# Patient Record
Sex: MALE | Race: BLACK OR AFRICAN AMERICAN | NOT HISPANIC OR LATINO | URBAN - METROPOLITAN AREA
[De-identification: names, ages, dates, MRNs, and addresses within clinical notes are randomized per-mention and may not be internally consistent; named-entity substitution may affect disease eponyms.]

---

## 2017-02-19 ENCOUNTER — EMERGENCY (EMERGENCY)
Facility: HOSPITAL | Age: 32
LOS: 1 days | Discharge: HOME | End: 2017-02-19
Attending: EMERGENCY MEDICINE | Admitting: EMERGENCY MEDICINE
Payer: COMMERCIAL

## 2017-02-19 VITALS
RESPIRATION RATE: 18 BRPM | OXYGEN SATURATION: 100 % | DIASTOLIC BLOOD PRESSURE: 104 MMHG | SYSTOLIC BLOOD PRESSURE: 147 MMHG | TEMPERATURE: 98 F | HEART RATE: 114 BPM

## 2017-02-19 PROCEDURE — 93010 ELECTROCARDIOGRAM REPORT: CPT | Mod: 59

## 2017-02-19 PROCEDURE — 99284 EMERGENCY DEPT VISIT MOD MDM: CPT | Mod: 25

## 2017-02-19 NOTE — ED ADULT TRIAGE NOTE - CHIEF COMPLAINT QUOTE
Pt c/o asthma exacerbation. States SOB since 5am. Has been using a new inhaler and nebulizer tx at home with no relief. + B/L expiratory wheezing observed. Denies CP. Pt c/o asthma exacerbation. States SOB since 5am. Has been using a new inhaler and nebulizer tx at home with no relief. + B/L expiratory wheezing observed. Denies CP. Respirations even/unlabored

## 2017-02-20 VITALS
RESPIRATION RATE: 16 BRPM | DIASTOLIC BLOOD PRESSURE: 63 MMHG | OXYGEN SATURATION: 100 % | SYSTOLIC BLOOD PRESSURE: 108 MMHG | HEART RATE: 104 BPM

## 2017-02-20 PROCEDURE — 71020: CPT | Mod: 26

## 2017-02-20 RX ORDER — IPRATROPIUM/ALBUTEROL SULFATE 18-103MCG
3 AEROSOL WITH ADAPTER (GRAM) INHALATION ONCE
Qty: 0 | Refills: 0 | Status: COMPLETED | OUTPATIENT
Start: 2017-02-20 | End: 2017-02-20

## 2017-02-20 RX ORDER — ALBUTEROL 90 UG/1
2 AEROSOL, METERED ORAL
Qty: 1 | Refills: 0 | OUTPATIENT
Start: 2017-02-20 | End: 2017-03-22

## 2017-02-20 RX ORDER — HYDROMORPHONE HYDROCHLORIDE 2 MG/ML
1 INJECTION INTRAMUSCULAR; INTRAVENOUS; SUBCUTANEOUS ONCE
Qty: 0 | Refills: 0 | Status: DISCONTINUED | OUTPATIENT
Start: 2017-02-20 | End: 2017-02-20

## 2017-02-20 RX ORDER — ALBUTEROL 90 UG/1
2.5 AEROSOL, METERED ORAL ONCE
Qty: 0 | Refills: 0 | Status: COMPLETED | OUTPATIENT
Start: 2017-02-20 | End: 2017-02-20

## 2017-02-20 RX ADMIN — Medication 3 MILLILITER(S): at 00:22

## 2017-02-20 RX ADMIN — ALBUTEROL 2.5 MILLIGRAM(S): 90 AEROSOL, METERED ORAL at 02:50

## 2017-02-20 RX ADMIN — Medication 3 MILLILITER(S): at 00:00

## 2017-02-20 RX ADMIN — Medication 3 MILLILITER(S): at 00:11

## 2017-02-20 RX ADMIN — ALBUTEROL 2.5 MILLIGRAM(S): 90 AEROSOL, METERED ORAL at 02:06

## 2017-02-20 RX ADMIN — Medication 60 MILLIGRAM(S): at 00:40

## 2017-02-20 NOTE — ED PROVIDER NOTE - MEDICAL DECISION MAKING DETAILS
asthma exacerbation, sob, no cough or fever, typical symptoms, unrelieved by albuterol->duonebs, prednisone, reassess

## 2017-02-20 NOTE — ED ADULT NURSE NOTE - CHIEF COMPLAINT QUOTE
Pt c/o asthma exacerbation. States SOB since 5am. Has been using a new inhaler and nebulizer tx at home with no relief. + B/L expiratory wheezing observed. Denies CP. Respirations even/unlabored

## 2017-02-20 NOTE — ED PROVIDER NOTE - ATTENDING CONTRIBUTION TO CARE
Marva  pt with h/o asthma  c/o 1 day of exacerbation related to change in weather  no reported fever or URI sxs  no recent travel  Last on steroids 1 yr ago  pt with b/l wheezing prolonged expiratory phase  but alert  able to speak in sentences  card with mild tachycardia  S1S2  no gallop  no perif edema [place]  pt with h/o asthma  c/o 1 day of exacerbation related to change in weather  no reported fever or URI sxs  no recent travel  Last on steroids 1 yr ago  pt with b/l wheezing prolonged expiratory phase  but alert  able to speak in sentences  card with mild tachycardia  S1S2  no gallop  no perif edema  CXR clear  pt relates can get PF to 400 when well

## 2017-02-20 NOTE — ED ADULT NURSE NOTE - OBJECTIVE STATEMENT
Pt arrives to Ed c/o asthma exacerbation.  Pt woke up this morning and stated he was having trouble breathing.  Pt used home inhaler and home nebulizer treatments with no improvement.  Pt has never been intubated.  Pt feels the weather changes are effecting his asthma. Pt denies CP or other complaints.  MD at bedside assessing pt.  Pt medicated as per EMAR.  Pt saturating 98% on RA prior to treatment.

## 2017-02-20 NOTE — ED ADULT NURSE NOTE - CHPI ED SYMPTOMS NEG
no diaphoresis/no fever/no chills/no headache/no edema/no chest pain/no cough/no hemoptysis/no body aches

## 2017-02-20 NOTE — ED PROVIDER NOTE - CARE PLAN
Principal Discharge DX:	Active asthma  Instructions for follow-up, activity and diet:	The patient was given verbal and written discharge instructions. Specifically, instructions when to return to the ED and when to seek follow-up from their pcp was discussed. Any specialty follow-up was discussed, including how to make an appointment.  Instructions were discussed in simple, plain language and was understood by the patient. The patient understands that should their symptoms worsen or any new symptoms arise, they should return to the ED immediately for further evaluation.

## 2017-02-20 NOTE — ED PROVIDER NOTE - OBJECTIVE STATEMENT
31yM hx asthma (never intubated, last ed visit >1yr ago, last exac 1mo ago), typically has asthma exacerbations with weather changes p/w sob and wheezing in setting of warmer weather today. SOB began at 9am today and worsened throughout day. Pt took a new asthma prn medication ("proair repoclick") x 2 treatments at 5pm and 2x albuterol neb treatments at 10pm today without improvement. Arrives in ED comfortable, but with sob and wheeze. Denies f/c, n/v/d, or chest pain. No recent illness.

## 2017-02-20 NOTE — ED PROVIDER NOTE - PLAN OF CARE
The patient was given verbal and written discharge instructions. Specifically, instructions when to return to the ED and when to seek follow-up from their pcp was discussed. Any specialty follow-up was discussed, including how to make an appointment.  Instructions were discussed in simple, plain language and was understood by the patient. The patient understands that should their symptoms worsen or any new symptoms arise, they should return to the ED immediately for further evaluation.

## 2017-03-02 ENCOUNTER — EMERGENCY (EMERGENCY)
Facility: HOSPITAL | Age: 32
LOS: 1 days | Discharge: HOME | End: 2017-03-02
Attending: EMERGENCY MEDICINE | Admitting: EMERGENCY MEDICINE
Payer: COMMERCIAL

## 2017-03-02 VITALS
SYSTOLIC BLOOD PRESSURE: 133 MMHG | HEART RATE: 64 BPM | RESPIRATION RATE: 14 BRPM | TEMPERATURE: 97 F | OXYGEN SATURATION: 100 % | DIASTOLIC BLOOD PRESSURE: 81 MMHG

## 2017-03-02 VITALS
HEART RATE: 70 BPM | TEMPERATURE: 99 F | OXYGEN SATURATION: 100 % | RESPIRATION RATE: 20 BRPM | SYSTOLIC BLOOD PRESSURE: 125 MMHG | DIASTOLIC BLOOD PRESSURE: 74 MMHG

## 2017-03-02 LAB
ALBUMIN SERPL ELPH-MCNC: 3.9 G/DL — SIGNIFICANT CHANGE UP (ref 3.3–5)
ALP SERPL-CCNC: 35 U/L — LOW (ref 40–120)
ALT FLD-CCNC: 11 U/L — SIGNIFICANT CHANGE UP (ref 4–41)
APPEARANCE UR: CLEAR — SIGNIFICANT CHANGE UP
AST SERPL-CCNC: 16 U/L — SIGNIFICANT CHANGE UP (ref 4–40)
BASOPHILS # BLD AUTO: 0.09 K/UL — SIGNIFICANT CHANGE UP (ref 0–0.2)
BASOPHILS NFR BLD AUTO: 0.9 % — SIGNIFICANT CHANGE UP (ref 0–2)
BILIRUB SERPL-MCNC: 0.3 MG/DL — SIGNIFICANT CHANGE UP (ref 0.2–1.2)
BILIRUB UR-MCNC: NEGATIVE — SIGNIFICANT CHANGE UP
BLOOD UR QL VISUAL: NEGATIVE — SIGNIFICANT CHANGE UP
BUN SERPL-MCNC: 17 MG/DL — SIGNIFICANT CHANGE UP (ref 7–23)
CALCIUM SERPL-MCNC: 9.1 MG/DL — SIGNIFICANT CHANGE UP (ref 8.4–10.5)
CHLORIDE SERPL-SCNC: 101 MMOL/L — SIGNIFICANT CHANGE UP (ref 98–107)
CO2 SERPL-SCNC: 31 MMOL/L — SIGNIFICANT CHANGE UP (ref 22–31)
COLOR SPEC: SIGNIFICANT CHANGE UP
CREAT SERPL-MCNC: 0.92 MG/DL — SIGNIFICANT CHANGE UP (ref 0.5–1.3)
EOSINOPHIL # BLD AUTO: 0.65 K/UL — HIGH (ref 0–0.5)
EOSINOPHIL NFR BLD AUTO: 6.7 % — HIGH (ref 0–6)
GLUCOSE SERPL-MCNC: 96 MG/DL — SIGNIFICANT CHANGE UP (ref 70–99)
GLUCOSE UR-MCNC: NEGATIVE — SIGNIFICANT CHANGE UP
HCT VFR BLD CALC: 42.8 % — SIGNIFICANT CHANGE UP (ref 39–50)
HGB BLD-MCNC: 14.1 G/DL — SIGNIFICANT CHANGE UP (ref 13–17)
IMM GRANULOCYTES NFR BLD AUTO: 1.1 % — SIGNIFICANT CHANGE UP (ref 0–1.5)
KETONES UR-MCNC: NEGATIVE — SIGNIFICANT CHANGE UP
LEUKOCYTE ESTERASE UR-ACNC: NEGATIVE — SIGNIFICANT CHANGE UP
LYMPHOCYTES # BLD AUTO: 2.4 K/UL — SIGNIFICANT CHANGE UP (ref 1–3.3)
LYMPHOCYTES # BLD AUTO: 24.7 % — SIGNIFICANT CHANGE UP (ref 13–44)
MCHC RBC-ENTMCNC: 30.2 PG — SIGNIFICANT CHANGE UP (ref 27–34)
MCHC RBC-ENTMCNC: 32.9 % — SIGNIFICANT CHANGE UP (ref 32–36)
MCV RBC AUTO: 91.6 FL — SIGNIFICANT CHANGE UP (ref 80–100)
MONOCYTES # BLD AUTO: 0.93 K/UL — HIGH (ref 0–0.9)
MONOCYTES NFR BLD AUTO: 9.6 % — SIGNIFICANT CHANGE UP (ref 2–14)
NEUTROPHILS # BLD AUTO: 5.52 K/UL — SIGNIFICANT CHANGE UP (ref 1.8–7.4)
NEUTROPHILS NFR BLD AUTO: 57 % — SIGNIFICANT CHANGE UP (ref 43–77)
NITRITE UR-MCNC: NEGATIVE — SIGNIFICANT CHANGE UP
PH UR: 7 — SIGNIFICANT CHANGE UP (ref 4.6–8)
PLATELET # BLD AUTO: 211 K/UL — SIGNIFICANT CHANGE UP (ref 150–400)
PMV BLD: 8.8 FL — SIGNIFICANT CHANGE UP (ref 7–13)
POTASSIUM SERPL-MCNC: 4.6 MMOL/L — SIGNIFICANT CHANGE UP (ref 3.5–5.3)
POTASSIUM SERPL-SCNC: 4.6 MMOL/L — SIGNIFICANT CHANGE UP (ref 3.5–5.3)
PROT SERPL-MCNC: 5.9 G/DL — LOW (ref 6–8.3)
PROT UR-MCNC: NEGATIVE — SIGNIFICANT CHANGE UP
RBC # BLD: 4.67 M/UL — SIGNIFICANT CHANGE UP (ref 4.2–5.8)
RBC # FLD: 13.5 % — SIGNIFICANT CHANGE UP (ref 10.3–14.5)
SODIUM SERPL-SCNC: 142 MMOL/L — SIGNIFICANT CHANGE UP (ref 135–145)
SP GR SPEC: 1.01 — SIGNIFICANT CHANGE UP (ref 1–1.03)
UROBILINOGEN FLD QL: NORMAL E.U. — SIGNIFICANT CHANGE UP (ref 0.1–0.2)
WBC # BLD: 9.7 K/UL — SIGNIFICANT CHANGE UP (ref 3.8–10.5)
WBC # FLD AUTO: 9.7 K/UL — SIGNIFICANT CHANGE UP (ref 3.8–10.5)
WBC UR QL: SIGNIFICANT CHANGE UP (ref 0–?)

## 2017-03-02 PROCEDURE — 74177 CT ABD & PELVIS W/CONTRAST: CPT | Mod: 26

## 2017-03-02 PROCEDURE — 99285 EMERGENCY DEPT VISIT HI MDM: CPT

## 2017-03-02 RX ADMIN — Medication 30 MILLILITER(S): at 09:06

## 2017-03-02 NOTE — ED PROVIDER NOTE - MEDICAL DECISION MAKING DETAILS
31 year old male with past medical history of Asthma (on Albuterol) presents to the Emergency Department complaining of constant LLQ abdominal pressure x2 days. Today morning, patient strained to have a loose bowel movement and noticed blood upon wiping. 31 year old male with past medical history of Asthma (on Albuterol) presents to the Emergency Department complaining of constant LLQ abdominal pressure x2 days. Today morning, patient strained to have a loose bowel movement and noticed blood upon wiping.- rectal exam: normal, ttp to LLQ: basic labs, ct- if negative pt stable for discharge and to follow up with GI outpt

## 2017-03-02 NOTE — ED PROVIDER NOTE - NS ED MD SCRIBE ATTENDING SCRIBE SECTIONS
HISTORY OF PRESENT ILLNESS/PAST MEDICAL/SURGICAL/SOCIAL HISTORY/HIV/REVIEW OF SYSTEMS/VITAL SIGNS( Pullset)/DISPOSITION/PHYSICAL EXAM

## 2017-03-02 NOTE — ED ADULT TRIAGE NOTE - CHIEF COMPLAINT QUOTE
Pt c/o non-radiating LLQ abd tenderness to palpation/uneasiness for 48hrs, denies n/v/dysuria/fevers/chills.  Pt reports increased pressure w/ BM, noticed scant amt blood after BM when wiping.  PMHx Asthma, denies sob/cp or other symptoms.  Pt appears comfortable in triage, no guarding/grimacing noticed.

## 2017-03-02 NOTE — ED PROVIDER NOTE - ATTENDING CONTRIBUTION TO CARE
I performed a face to face evaluation of this patient and obtained a history and performed a full exam except the  and rectal exam.  I agree with the history, physical exam and plan of the PA.  pt with lower llq pain, some loose stool x 2 days. No dysuria, fever, travel.  Belly soft without masses but llq ttp. No cvat.  For labs, UA, reassess.  CT abdomen/pelvis

## 2017-03-02 NOTE — ED PROVIDER NOTE - PROGRESS NOTE DETAILS
The scribe's documentation has been prepared under my direction and personally reviewed by me in its entirety. I confirm that the note above accurately reflects all work, treatment, procedures, and medical decision making performed by me. CHANEL Lundberg Labs and ct reviewed. Pt feeling better. Pt stable for discharge and to follow up with GI outpt.

## 2017-03-02 NOTE — ED PROVIDER NOTE - OBJECTIVE STATEMENT
31 year old male with past medical history of Asthma (on Albuterol) presents to the Emergency Department complaining of constant LLQ abdominal pressure x2 days. Today morning, patient strained to have a loose bowel movement and noticed blood upon wiping. No prior history of symptoms or sick contacts.   Denies fever, chills, nausea, vomiting, chest pain, shortness of breath, urinary symptoms, URI symptoms, or any other complaints. 31 year old male with past medical history of Asthma (on Albuterol) presents to the Emergency Department complaining of constant LLQ abdominal pressure x2 days. Today morning, patient strained to have a loose bowel movement and noticed blood upon wiping. No prior history of symptoms or sick contacts.   Denies fever, chills, nausea, vomiting, chest pain, shortness of breath, urinary symptoms, URI symptoms, or any other complaints. Denies any penile pain or discharge.

## 2017-03-08 NOTE — ED PROVIDER NOTE - ATTESTATION, MLM
Spoke with Kortney @ [facility] - she will check for implant records and fax it over.     Faxed [facility] to push images to PACS.    I have reviewed and confirmed nurses' notes for patient's medications, allergies, medical history, and surgical history.

## 2017-05-23 ENCOUNTER — EMERGENCY (EMERGENCY)
Facility: HOSPITAL | Age: 32
LOS: 1 days | Discharge: HOME | End: 2017-05-23
Attending: EMERGENCY MEDICINE | Admitting: EMERGENCY MEDICINE
Payer: COMMERCIAL

## 2017-05-23 VITALS
RESPIRATION RATE: 16 BRPM | SYSTOLIC BLOOD PRESSURE: 107 MMHG | DIASTOLIC BLOOD PRESSURE: 61 MMHG | HEART RATE: 64 BPM | TEMPERATURE: 98 F | OXYGEN SATURATION: 96 %

## 2017-05-23 VITALS
OXYGEN SATURATION: 96 % | DIASTOLIC BLOOD PRESSURE: 93 MMHG | RESPIRATION RATE: 18 BRPM | TEMPERATURE: 99 F | HEART RATE: 78 BPM | SYSTOLIC BLOOD PRESSURE: 139 MMHG

## 2017-05-23 LAB
ALBUMIN SERPL ELPH-MCNC: 4.4 G/DL — SIGNIFICANT CHANGE UP (ref 3.3–5)
ALP SERPL-CCNC: 43 U/L — SIGNIFICANT CHANGE UP (ref 40–120)
ALT FLD-CCNC: 13 U/L — SIGNIFICANT CHANGE UP (ref 4–41)
AST SERPL-CCNC: 16 U/L — SIGNIFICANT CHANGE UP (ref 4–40)
BASOPHILS # BLD AUTO: 0.09 K/UL — SIGNIFICANT CHANGE UP (ref 0–0.2)
BASOPHILS NFR BLD AUTO: 1.1 % — SIGNIFICANT CHANGE UP (ref 0–2)
BILIRUB SERPL-MCNC: 0.3 MG/DL — SIGNIFICANT CHANGE UP (ref 0.2–1.2)
BUN SERPL-MCNC: 17 MG/DL — SIGNIFICANT CHANGE UP (ref 7–23)
CALCIUM SERPL-MCNC: 9.1 MG/DL — SIGNIFICANT CHANGE UP (ref 8.4–10.5)
CHLORIDE SERPL-SCNC: 106 MMOL/L — SIGNIFICANT CHANGE UP (ref 98–107)
CO2 SERPL-SCNC: 25 MMOL/L — SIGNIFICANT CHANGE UP (ref 22–31)
CREAT SERPL-MCNC: 1.15 MG/DL — SIGNIFICANT CHANGE UP (ref 0.5–1.3)
EOSINOPHIL # BLD AUTO: 1.22 K/UL — HIGH (ref 0–0.5)
EOSINOPHIL NFR BLD AUTO: 15.2 % — HIGH (ref 0–6)
GLUCOSE SERPL-MCNC: 113 MG/DL — HIGH (ref 70–99)
HBA1C BLD-MCNC: 5.9 % — HIGH (ref 4–5.6)
HCT VFR BLD CALC: 44.8 % — SIGNIFICANT CHANGE UP (ref 39–50)
HGB BLD-MCNC: 14.9 G/DL — SIGNIFICANT CHANGE UP (ref 13–17)
IMM GRANULOCYTES NFR BLD AUTO: 0.4 % — SIGNIFICANT CHANGE UP (ref 0–1.5)
LYMPHOCYTES # BLD AUTO: 1.7 K/UL — SIGNIFICANT CHANGE UP (ref 1–3.3)
LYMPHOCYTES # BLD AUTO: 21.2 % — SIGNIFICANT CHANGE UP (ref 13–44)
MCHC RBC-ENTMCNC: 30 PG — SIGNIFICANT CHANGE UP (ref 27–34)
MCHC RBC-ENTMCNC: 33.3 % — SIGNIFICANT CHANGE UP (ref 32–36)
MCV RBC AUTO: 90.1 FL — SIGNIFICANT CHANGE UP (ref 80–100)
MONOCYTES # BLD AUTO: 0.59 K/UL — SIGNIFICANT CHANGE UP (ref 0–0.9)
MONOCYTES NFR BLD AUTO: 7.4 % — SIGNIFICANT CHANGE UP (ref 2–14)
NEUTROPHILS # BLD AUTO: 4.38 K/UL — SIGNIFICANT CHANGE UP (ref 1.8–7.4)
NEUTROPHILS NFR BLD AUTO: 54.7 % — SIGNIFICANT CHANGE UP (ref 43–77)
PLATELET # BLD AUTO: 200 K/UL — SIGNIFICANT CHANGE UP (ref 150–400)
PMV BLD: 9.6 FL — SIGNIFICANT CHANGE UP (ref 7–13)
POTASSIUM SERPL-MCNC: 4.1 MMOL/L — SIGNIFICANT CHANGE UP (ref 3.5–5.3)
POTASSIUM SERPL-SCNC: 4.1 MMOL/L — SIGNIFICANT CHANGE UP (ref 3.5–5.3)
PROT SERPL-MCNC: 6.5 G/DL — SIGNIFICANT CHANGE UP (ref 6–8.3)
RBC # BLD: 4.97 M/UL — SIGNIFICANT CHANGE UP (ref 4.2–5.8)
RBC # FLD: 12.4 % — SIGNIFICANT CHANGE UP (ref 10.3–14.5)
SODIUM SERPL-SCNC: 144 MMOL/L — SIGNIFICANT CHANGE UP (ref 135–145)
WBC # BLD: 8.01 K/UL — SIGNIFICANT CHANGE UP (ref 3.8–10.5)
WBC # FLD AUTO: 8.01 K/UL — SIGNIFICANT CHANGE UP (ref 3.8–10.5)

## 2017-05-23 PROCEDURE — 99234 HOSP IP/OBS SM DT SF/LOW 45: CPT

## 2017-05-23 RX ORDER — SODIUM CHLORIDE 9 MG/ML
1000 INJECTION INTRAMUSCULAR; INTRAVENOUS; SUBCUTANEOUS ONCE
Qty: 0 | Refills: 0 | Status: COMPLETED | OUTPATIENT
Start: 2017-05-23 | End: 2017-05-23

## 2017-05-23 RX ORDER — IPRATROPIUM/ALBUTEROL SULFATE 18-103MCG
3 AEROSOL WITH ADAPTER (GRAM) INHALATION ONCE
Qty: 0 | Refills: 0 | Status: COMPLETED | OUTPATIENT
Start: 2017-05-23 | End: 2017-05-23

## 2017-05-23 RX ORDER — IPRATROPIUM/ALBUTEROL SULFATE 18-103MCG
3 AEROSOL WITH ADAPTER (GRAM) INHALATION EVERY 4 HOURS
Qty: 0 | Refills: 0 | Status: DISCONTINUED | OUTPATIENT
Start: 2017-05-23 | End: 2017-05-27

## 2017-05-23 RX ORDER — MAGNESIUM SULFATE 500 MG/ML
2 VIAL (ML) INJECTION ONCE
Qty: 0 | Refills: 0 | Status: COMPLETED | OUTPATIENT
Start: 2017-05-23 | End: 2017-05-23

## 2017-05-23 RX ADMIN — Medication 50 GRAM(S): at 08:44

## 2017-05-23 RX ADMIN — Medication 3 MILLILITER(S): at 08:15

## 2017-05-23 RX ADMIN — SODIUM CHLORIDE 1000 MILLILITER(S): 9 INJECTION INTRAMUSCULAR; INTRAVENOUS; SUBCUTANEOUS at 08:44

## 2017-05-23 RX ADMIN — Medication 3 MILLILITER(S): at 07:49

## 2017-05-23 RX ADMIN — Medication 3 MILLILITER(S): at 07:21

## 2017-05-23 RX ADMIN — Medication 50 MILLIGRAM(S): at 07:21

## 2017-05-23 RX ADMIN — Medication 3 MILLILITER(S): at 13:21

## 2017-05-23 NOTE — ED PROVIDER NOTE - OBJECTIVE STATEMENT
30 yo M pmhx of asthma ( no intubations, several lifetime admissions) here for wheezing x today. pt states he woke up feeling like he was wheezing, used albuterol neb x 2, last being 2 hours PTA but still felt wheezing. States he felt like he needed a steroid to help his symptoms. Otherwise well. Denies fever, vomiting, CP, HA, dizziness, congestion

## 2017-05-23 NOTE — ED ADULT NURSE NOTE - OBJECTIVE STATEMENT
Pt. started wheezing about 4 hours ago, took Proventil at home. Pt. noted with inspiratory and expiratory wheezing. Pt. in tripod position for comfort. Administered Duoneb as ordered and Prednisone as ordered.

## 2017-05-23 NOTE — ED ADULT TRIAGE NOTE - CHIEF COMPLAINT QUOTE
Pt c/o difficulty breathing and wheezing since 3am.  Pt took regular meds at home with no relief, states "I think I need a steroid."

## 2017-05-23 NOTE — ED CDU PROVIDER NOTE - ATTENDING CONTRIBUTION TO CARE
pt with asthma exacerbation - lungs with diffuse exp wheezes and prolonged expiratory phase but good air movement.  Will treat symptomatically with nebs and steroids.  Likely able to be dc home 32 yo M with hx asthma here with asthma exacerbation - lungs with diffuse exp wheezes and prolonged expiratory phase but good air movement.  Will treat symptomatically with nebs and steroids and observe for improvement in the CDU.  I performed a history and physical exam of the patient and discussed their management with the advanced care provider. I reviewed the advanced care provider's note and agree with the documented findings and plan of care. My medical decision making and objective findings are found above.

## 2017-05-23 NOTE — ED PROVIDER NOTE - PROGRESS NOTE DETAILS
CHANEL Scherer: discussed with patient, improved however still wheezing with mild retractions, labs reassuring, good candidate for CDU, pt agrees for CDU. D/w CDU attending and PA. Pt accepted.

## 2017-05-23 NOTE — ED CDU PROVIDER NOTE - PLAN OF CARE
Rest, drink plenty of fluids  Advance activity as tolerated  Take Prednisone 40 mg once a day for five days  Use inhaler/nebulizer as directed- every 4-6 hours as needed  Continue all previously prescribed medications as tolerated  Follow up with your PMD 2-3 days  Return to the ER for worsening or concerning symptoms.

## 2017-05-23 NOTE — ED PROVIDER NOTE - ATTENDING CONTRIBUTION TO CARE
pt with asthma exacerbation - lungs with diffuse exp wheezes and prolonged expiratory phase but good air movement.  Will treat symptomatically with nebs and steroids.  Likely able to be dc home pt with asthma exacerbation - lungs with diffuse exp wheezes and prolonged expiratory phase but good air movement.  Will treat symptomatically with nebs and steroids.  Likely able to be dc home.

## 2017-05-23 NOTE — ED CDU PROVIDER NOTE - MEDICAL DECISION MAKING DETAILS
32 yo M here for asthma exacerbation. duonebs x 3, steroids, mg  possible cdu vs. d/c home Asthma exacerbation: Duo nebs, steroids, will re eval

## 2017-05-23 NOTE — ED CDU PROVIDER NOTE - OBJECTIVE STATEMENT
32 y/o M PMHx of asthma ( no intubations, several lifetime admissions) here for wheezing for 2 days. pt states he woke up feeling like he was wheezing, used albuterol neb x 2, last being 2 hours PTA  with not much relief. States he felt like he needed a steroid to help his symptoms. Otherwise well. Denies fever, vomiting, CP, HA, dizziness, congestion. Pt feeling better after the 3 doses of Duonebs, IVPB Magnesium sulfate, prednisone. 30 y/o M PMHx of asthma ( no intubations, several lifetime admissions) here for wheezing for 2 days. pt states he woke up feeling like he was wheezing, used albuterol neb x 2, last being 2 hours PTA  with not much relief. States he felt like he needed a steroid to help his symptoms. Otherwise well. Denies fever, vomiting, CP, HA, dizziness, congestion.

## 2017-05-23 NOTE — ED CDU PROVIDER NOTE - DETAILS
30 y/o pt is in CDU to get his mediaction for asthma and reassess Asthma exacerbation, duonebs, steroids, re- eval Asthma exacerbation, Duoneb, steroids, re- eval

## 2017-05-23 NOTE — ED ADULT NURSE REASSESSMENT NOTE - NS ED NURSE REASSESS COMMENT FT1
Received pt from [facility], pt A&Ox3, hx of asthma, here for shortness of breath and wheezing, pt given his third duoneb treatment, reports improvement in shortness of breath. Denies any chest pain, dizziness, nausea, vomiting, palpitations, diarrhea, fever, constipation, or chills. Will continue to monitor.

## 2017-05-23 NOTE — ED PROVIDER NOTE - CHPI ED SYMPTOMS NEG
no body aches/no chills/no diaphoresis/no fever/no edema/no cough/no hemoptysis/no headache/no chest pain

## 2017-05-23 NOTE — ED CDU PROVIDER NOTE - PROGRESS NOTE DETAILS
CHANEL Scherer: discussed with patient, improved however still wheezing with mild retractions, labs reassuring, good candidate for CDU, pt agrees for CDU. D/w CDU attending and PA. Pt accepted. Juan PA: Pt feeling better after the 3 doses of Duonebs, IVPB Magnesium sulfate, prednisone. Will continue to monitor. I, Magdalena Bruce MD, have personally performed a face to face diagnostic evaluation on this patient.  I have reviewed the ACP note and agree with the history, exam, and plan of care, except as noted.  Pt having significant improvement with nebs and steroids; decrease in expiratory wheezing. I, Dr Magdalena Bruce have seen and evaluated the patient. The patient reports improvement in symptoms. The patient is stable for discharge home and will follow up with their primary physician. CHANEL Lundberg: Pt feeling better. Lungs, clear bilaterally, no wheezing noted. No signs of respiratory distress. Pt stable for discharge and to follow up with pmd.

## 2019-04-18 ENCOUNTER — EMERGENCY (EMERGENCY)
Facility: HOSPITAL | Age: 34
LOS: 1 days | Discharge: HOME | End: 2019-04-18
Attending: EMERGENCY MEDICINE | Admitting: EMERGENCY MEDICINE
Payer: COMMERCIAL

## 2019-04-18 PROCEDURE — 99284 EMERGENCY DEPT VISIT MOD MDM: CPT | Mod: 25

## 2019-04-19 VITALS
HEART RATE: 79 BPM | TEMPERATURE: 98 F | SYSTOLIC BLOOD PRESSURE: 122 MMHG | RESPIRATION RATE: 18 BRPM | DIASTOLIC BLOOD PRESSURE: 91 MMHG | OXYGEN SATURATION: 98 %

## 2019-04-19 VITALS
SYSTOLIC BLOOD PRESSURE: 112 MMHG | HEART RATE: 82 BPM | TEMPERATURE: 98 F | DIASTOLIC BLOOD PRESSURE: 66 MMHG | OXYGEN SATURATION: 95 % | RESPIRATION RATE: 16 BRPM

## 2019-04-19 RX ORDER — IPRATROPIUM/ALBUTEROL SULFATE 18-103MCG
3 AEROSOL WITH ADAPTER (GRAM) INHALATION ONCE
Qty: 0 | Refills: 0 | Status: COMPLETED | OUTPATIENT
Start: 2019-04-19 | End: 2019-04-19

## 2019-04-19 RX ORDER — ALBUTEROL 90 UG/1
1 AEROSOL, METERED ORAL ONCE
Qty: 0 | Refills: 0 | Status: COMPLETED | OUTPATIENT
Start: 2019-04-19 | End: 2019-04-19

## 2019-04-19 RX ORDER — ALBUTEROL 90 UG/1
2.5 AEROSOL, METERED ORAL
Qty: 0 | Refills: 0 | Status: DISCONTINUED | OUTPATIENT
Start: 2019-04-19 | End: 2019-04-19

## 2019-04-19 RX ORDER — EPINEPHRINE 0.3 MG/.3ML
3 INJECTION INTRAMUSCULAR; SUBCUTANEOUS
Qty: 300 | Refills: 0 | OUTPATIENT
Start: 2019-04-19 | End: 2019-05-18

## 2019-04-19 RX ADMIN — ALBUTEROL 1 PUFF(S): 90 AEROSOL, METERED ORAL at 06:28

## 2019-04-19 RX ADMIN — Medication 50 MILLIGRAM(S): at 01:44

## 2019-04-19 RX ADMIN — Medication 3 MILLILITER(S): at 01:11

## 2019-04-19 NOTE — ED ADULT NURSE NOTE - OBJECTIVE STATEMENT
pt received to room 14 a/o x 3 c/o asthma exacerbation that started at 6pm yesterday. Pt c/o chest tightness, wheezing. pt took 4 puff of his inhaler with no relief. Pt never been intubated and usually gets like this during the change of seasons. Respirations even and slightly labored. wheezing auscultated throughout with equal chest rise bilaterally. ABD is soft, non tender, non distended with normal active bowel sounds No complaints of chest pain, headache, nausea, dizziness, vomiting  SOB, fever, chills verbalized. Medications given as per order.

## 2019-04-19 NOTE — ED PROVIDER NOTE - NSFOLLOWUPINSTRUCTIONS_ED_ALL_ED_FT
- Follow up with Pulmonologist in 2 days. See the referral.  - See your PMD in 2 days.  -  medicines from your pharmacy.   - Return to the ED with any worsening of the symptoms.

## 2019-04-19 NOTE — ED PROVIDER NOTE - ATTENDING CONTRIBUTION TO CARE
History and physical above obtained and documented (or dictated) by me (attending physician).  Resident involved in case for assistance with disposition and may document involvement as necessary via progress note(s).   -Dr. Rod

## 2019-04-19 NOTE — ED PROVIDER NOTE - PHYSICAL EXAMINATION
Gen: Alert, mild resp distress  Head: NC, AT,  EOMI, normal lids/conjunctiva  ENT:  normal hearing, patent oropharynx without erythema/exudate  Neck: +supple, no tenderness/meningismus/JVD, +Trachea midline  Chest: no chest wall tenderness, equal chest rise  Pulm: Bilateral BS, tachpneic, +diffuse wheezing, prolonged expiratory phase, but speaking in full sentencess  CV: RRR, no M/R/G, +dist pulses  Abd: +BS, soft, NT/ND  Rectal: deferred  Mskel: no edema/erythema/cyanosis  Skin: no rash  Neuro: AAOx3

## 2019-04-19 NOTE — ED PROVIDER NOTE - CLINICAL SUMMARY MEDICAL DECISION MAKING FREE TEXT BOX
32yo M pmh as above here for wheezing chest tightness since 6pm. H&P consistent w/ asthma exacerbation. Kendall, prednisone, reassess, likely dc.

## 2019-04-19 NOTE — ED PROVIDER NOTE - OBJECTIVE STATEMENT
Pertinent PMH/PSH/FHx/SHx and Review of Systems contained within:  32yo M, pmh of asthma (never intubated, can't recall last hospitalization, usually well-controlled w/ home MDI), c/o wheezing/chest tightness since approx 6pm. Pt had URI like symptoms 1.5wks ago (cough, rhinorrhea, fever), those symptoms resolved a week ago. Began wheezing at approx 6pm last night, and it has been progressively worsening, associated w/ chest tightness and occasional cough productive of clear sputum; symptoms only mildly improved with albuterol MDI which pt has used 3X since then. States he has nebulizer machine at home but ran out of albuterol solution 1yr ago. Pt endorses smoking marijuana, last time was 1.5wks ago. No fever/chills, No photophobia/eye pain/changes in vision, No ear pain/sore throat/dysphagia, No chest pain/palpitations, no stridor, No abdominal pain, No N/V/D, no dysuria/frequency/discharge, No neck/back pain, no rash, no new focal neuro symptoms.

## 2019-04-19 NOTE — ED ADULT TRIAGE NOTE - CHIEF COMPLAINT QUOTE
Pt c/o SOB, chest discomfort/tightness when breathing in since 6 hours ago. Resps even and unlabored, speaking in full/complete sentences Appears in NAD. Took 4 albuterol puffs PTA. EKG completed. PMHx asthma.

## 2019-04-19 NOTE — ED PROVIDER NOTE - NSFOLLOWUPCLINICS_GEN_ALL_ED_FT
[facility] Pulmonolgy and Sleep Medicine  Pulmonology  [address] [place] [address]  Phone: [phone]  Fax:   Follow Up Time:

## 2019-07-10 ENCOUNTER — EMERGENCY (EMERGENCY)
Facility: HOSPITAL | Age: 34
LOS: 1 days | Discharge: HOME | End: 2019-07-10
Attending: EMERGENCY MEDICINE | Admitting: EMERGENCY MEDICINE
Payer: COMMERCIAL

## 2019-07-10 VITALS
RESPIRATION RATE: 16 BRPM | OXYGEN SATURATION: 97 % | SYSTOLIC BLOOD PRESSURE: 147 MMHG | DIASTOLIC BLOOD PRESSURE: 88 MMHG | TEMPERATURE: 98 F | HEART RATE: 63 BPM

## 2019-07-10 LAB
ALBUMIN SERPL ELPH-MCNC: 4.9 G/DL — SIGNIFICANT CHANGE UP (ref 3.3–5)
ALP SERPL-CCNC: 50 U/L — SIGNIFICANT CHANGE UP (ref 40–120)
ALT FLD-CCNC: 9 U/L — SIGNIFICANT CHANGE UP (ref 4–41)
ANION GAP SERPL CALC-SCNC: 11 MMO/L — SIGNIFICANT CHANGE UP (ref 7–14)
AST SERPL-CCNC: 16 U/L — SIGNIFICANT CHANGE UP (ref 4–40)
BASE EXCESS BLDV CALC-SCNC: 5 MMOL/L — SIGNIFICANT CHANGE UP
BASOPHILS # BLD AUTO: 0.15 K/UL — SIGNIFICANT CHANGE UP (ref 0–0.2)
BASOPHILS NFR BLD AUTO: 1.9 % — SIGNIFICANT CHANGE UP (ref 0–2)
BILIRUB SERPL-MCNC: 0.6 MG/DL — SIGNIFICANT CHANGE UP (ref 0.2–1.2)
BLOOD GAS VENOUS - CREATININE: 1.08 MG/DL — SIGNIFICANT CHANGE UP (ref 0.5–1.3)
BLOOD GAS VENOUS - FIO2: 21 — SIGNIFICANT CHANGE UP
BUN SERPL-MCNC: 12 MG/DL — SIGNIFICANT CHANGE UP (ref 7–23)
CALCIUM SERPL-MCNC: 9.8 MG/DL — SIGNIFICANT CHANGE UP (ref 8.4–10.5)
CHLORIDE BLDV-SCNC: 106 MMOL/L — SIGNIFICANT CHANGE UP (ref 96–108)
CHLORIDE SERPL-SCNC: 103 MMOL/L — SIGNIFICANT CHANGE UP (ref 98–107)
CO2 SERPL-SCNC: 25 MMOL/L — SIGNIFICANT CHANGE UP (ref 22–31)
CREAT SERPL-MCNC: 1.12 MG/DL — SIGNIFICANT CHANGE UP (ref 0.5–1.3)
EOSINOPHIL # BLD AUTO: 1.39 K/UL — HIGH (ref 0–0.5)
EOSINOPHIL NFR BLD AUTO: 17.7 % — HIGH (ref 0–6)
GAS PNL BLDV: 136 MMOL/L — SIGNIFICANT CHANGE UP (ref 136–146)
GLUCOSE BLDV-MCNC: 96 MG/DL — SIGNIFICANT CHANGE UP (ref 70–99)
GLUCOSE SERPL-MCNC: 100 MG/DL — HIGH (ref 70–99)
HBA1C BLD-MCNC: 5.8 % — HIGH (ref 4–5.6)
HCO3 BLDV-SCNC: 25 MMOL/L — SIGNIFICANT CHANGE UP (ref 20–27)
HCT VFR BLD CALC: 51.5 % — HIGH (ref 39–50)
HCT VFR BLDV CALC: 53.1 % — HIGH (ref 39–51)
HGB BLD-MCNC: 17.1 G/DL — HIGH (ref 13–17)
HGB BLDV-MCNC: 17.4 G/DL — HIGH (ref 13–17)
IMM GRANULOCYTES NFR BLD AUTO: 0.3 % — SIGNIFICANT CHANGE UP (ref 0–1.5)
LACTATE BLDV-MCNC: 1.8 MMOL/L — SIGNIFICANT CHANGE UP (ref 0.5–2)
LYMPHOCYTES # BLD AUTO: 1.82 K/UL — SIGNIFICANT CHANGE UP (ref 1–3.3)
LYMPHOCYTES # BLD AUTO: 23.2 % — SIGNIFICANT CHANGE UP (ref 13–44)
MCHC RBC-ENTMCNC: 29 PG — SIGNIFICANT CHANGE UP (ref 27–34)
MCHC RBC-ENTMCNC: 33.2 % — SIGNIFICANT CHANGE UP (ref 32–36)
MCV RBC AUTO: 87.3 FL — SIGNIFICANT CHANGE UP (ref 80–100)
MONOCYTES # BLD AUTO: 0.75 K/UL — SIGNIFICANT CHANGE UP (ref 0–0.9)
MONOCYTES NFR BLD AUTO: 9.6 % — SIGNIFICANT CHANGE UP (ref 2–14)
NEUTROPHILS # BLD AUTO: 3.71 K/UL — SIGNIFICANT CHANGE UP (ref 1.8–7.4)
NEUTROPHILS NFR BLD AUTO: 47.3 % — SIGNIFICANT CHANGE UP (ref 43–77)
NRBC # FLD: 0 K/UL — SIGNIFICANT CHANGE UP (ref 0–0)
PCO2 BLDV: 63 MMHG — HIGH (ref 41–51)
PH BLDV: 7.31 PH — LOW (ref 7.32–7.43)
PLATELET # BLD AUTO: 244 K/UL — SIGNIFICANT CHANGE UP (ref 150–400)
PMV BLD: 9.4 FL — SIGNIFICANT CHANGE UP (ref 7–13)
PO2 BLDV: 27 MMHG — LOW (ref 35–40)
POTASSIUM BLDV-SCNC: 4.1 MMOL/L — SIGNIFICANT CHANGE UP (ref 3.4–4.5)
POTASSIUM SERPL-MCNC: 4.5 MMOL/L — SIGNIFICANT CHANGE UP (ref 3.5–5.3)
POTASSIUM SERPL-SCNC: 4.5 MMOL/L — SIGNIFICANT CHANGE UP (ref 3.5–5.3)
PROT SERPL-MCNC: 7.5 G/DL — SIGNIFICANT CHANGE UP (ref 6–8.3)
RBC # BLD: 5.9 M/UL — HIGH (ref 4.2–5.8)
RBC # FLD: 12 % — SIGNIFICANT CHANGE UP (ref 10.3–14.5)
SAO2 % BLDV: 41.5 % — LOW (ref 60–85)
SODIUM SERPL-SCNC: 139 MMOL/L — SIGNIFICANT CHANGE UP (ref 135–145)
WBC # BLD: 7.84 K/UL — SIGNIFICANT CHANGE UP (ref 3.8–10.5)
WBC # FLD AUTO: 7.84 K/UL — SIGNIFICANT CHANGE UP (ref 3.8–10.5)

## 2019-07-10 PROCEDURE — 99218: CPT

## 2019-07-10 PROCEDURE — 71046 X-RAY EXAM CHEST 2 VIEWS: CPT | Mod: 26

## 2019-07-10 RX ORDER — MOMETASONE FUROATE AND FORMOTEROL FUMARATE DIHYDRATE 200; 5 UG/1; UG/1
2 AEROSOL RESPIRATORY (INHALATION)
Qty: 1 | Refills: 0
Start: 2019-07-10 | End: 2019-08-08

## 2019-07-10 RX ORDER — IPRATROPIUM/ALBUTEROL SULFATE 18-103MCG
3 AEROSOL WITH ADAPTER (GRAM) INHALATION
Refills: 0 | Status: COMPLETED | OUTPATIENT
Start: 2019-07-10 | End: 2019-07-10

## 2019-07-10 RX ORDER — SODIUM CHLORIDE 9 MG/ML
1000 INJECTION INTRAMUSCULAR; INTRAVENOUS; SUBCUTANEOUS ONCE
Refills: 0 | Status: COMPLETED | OUTPATIENT
Start: 2019-07-10 | End: 2019-07-10

## 2019-07-10 RX ORDER — MAGNESIUM SULFATE 500 MG/ML
2 VIAL (ML) INJECTION ONCE
Refills: 0 | Status: COMPLETED | OUTPATIENT
Start: 2019-07-10 | End: 2019-07-10

## 2019-07-10 RX ORDER — SODIUM CHLORIDE 9 MG/ML
1000 INJECTION INTRAMUSCULAR; INTRAVENOUS; SUBCUTANEOUS
Refills: 0 | Status: DISCONTINUED | OUTPATIENT
Start: 2019-07-10 | End: 2019-07-14

## 2019-07-10 RX ORDER — IPRATROPIUM/ALBUTEROL SULFATE 18-103MCG
3 AEROSOL WITH ADAPTER (GRAM) INHALATION EVERY 6 HOURS
Refills: 0 | Status: DISCONTINUED | OUTPATIENT
Start: 2019-07-10 | End: 2019-07-14

## 2019-07-10 RX ORDER — ALBUTEROL 90 UG/1
2 AEROSOL, METERED ORAL
Qty: 1 | Refills: 0
Start: 2019-07-10 | End: 2019-08-08

## 2019-07-10 RX ADMIN — Medication 3 MILLILITER(S): at 08:03

## 2019-07-10 RX ADMIN — Medication 40 MILLIGRAM(S): at 16:22

## 2019-07-10 RX ADMIN — Medication 50 GRAM(S): at 07:51

## 2019-07-10 RX ADMIN — Medication 3 MILLILITER(S): at 08:01

## 2019-07-10 RX ADMIN — SODIUM CHLORIDE 125 MILLILITER(S): 9 INJECTION INTRAMUSCULAR; INTRAVENOUS; SUBCUTANEOUS at 18:20

## 2019-07-10 RX ADMIN — Medication 3 MILLILITER(S): at 22:10

## 2019-07-10 RX ADMIN — Medication 3 MILLILITER(S): at 16:02

## 2019-07-10 RX ADMIN — SODIUM CHLORIDE 1000 MILLILITER(S): 9 INJECTION INTRAMUSCULAR; INTRAVENOUS; SUBCUTANEOUS at 07:52

## 2019-07-10 RX ADMIN — Medication 125 MILLIGRAM(S): at 07:51

## 2019-07-10 RX ADMIN — Medication 3 MILLILITER(S): at 07:52

## 2019-07-10 NOTE — ED CDU PROVIDER INITIAL DAY NOTE - ATTENDING CONTRIBUTION TO CARE
arnaldo: long hx asthma with multiple ED visits and hospitalizations, presents with one week increasing sx, worse past 24 hours. No URI sx. Pt attributes sx to change in weather. also, uninsured now with insurance to start july 23 and pt ran out of symbicort.   In ED: pt received LYLE, steroids and Mg due to low pefr. Marked improvement in sx. Given severity of sx pt will continue treatment and monitoring in the CDU.   exam: NAD, rr 16-18. walks w/o diff. bilateral wheezing on auscultation.   Impression: acute asthma in pt with chronici persistent asthma.  recc: Continue BD rx.   of note: pt will receive 30 d free trial of dulera pending pts insurance coverage at end of month (pharmacy contacted). prescription sent by myself to [facility]. arnaldo: long hx asthma with multiple ED visits and hospitalizations, presents with one week increasing sx, worse past 24 hours. No URI sx. Pt attributes sx to change in weather. also, uninsured now with insurance to start july 23 and pt ran out of symbicort.   In ED: pt received LYLE, steroids and Mg due to low pefr. Marked improvement in sx. Given severity of sx pt will continue treatment and monitoring in the CDU.   exam: NAD, rr 16-18. walks w/o diff. bilateral wheezing on auscultation.   Impression: acute asthma in pt with chronici persistent asthma.  recc: Continue BD rx.   of note: pt will receive 30 d free trial of dulera pending pts insurance coverage at end of month (pharmacy contacted). prescription sent by myself to [facility] pharmacy..

## 2019-07-10 NOTE — ED CDU PROVIDER INITIAL DAY NOTE - OBJECTIVE STATEMENT
32 y/o male with pmhx of asthma (no hx of intubations) presents to ED for asthma exacerbation x 1 day. Pt c/o chest tightness, sob and wheezing. Notes a change in weather usually causes his symptoms to get worse. Pt stopped taking his daily asthma med due to insurance issues. Pt improved with duonebs in ED. Peak flow 200 in ED, baseline is 300. No fever, chills sore throat rhinorrhea, cough, palpitations abd pain, n/v, weakness, numbness, dysuria. No recent travel, surgeries, hospitalizations, le edema. States last asthma exacerbation was 1-2 months ago

## 2019-07-10 NOTE — ED ADULT NURSE NOTE - OBJECTIVE STATEMENT
33 year old male with a PMHx of asthma.  P/w SOB, wheezing and chest tightness for about 8 hours.  wheezing auditory.  respirations labored.  Pt has been using his inhaler with mild relief.  Denies n/v/f/c, CP, SOB.  18G R AC.  Peak flow=100 on arrival

## 2019-07-10 NOTE — ED ADULT NURSE REASSESSMENT NOTE - NS ED NURSE REASSESS COMMENT FT1
Pt states he feels better but continues with inspiratory and expiratory wheezing. Explained to pt he is admitted to CDU for observation. Report to be given to RN

## 2019-07-10 NOTE — ED CDU PROVIDER INITIAL DAY NOTE - MEDICAL DECISION MAKING DETAILS
34 y/o male with pmhx of asthma (no hx of intubations) presents to ED for asthma exacerbation x 1 day. plan for duciro, solu medrol, reassess.

## 2019-07-10 NOTE — ED ADULT NURSE REASSESSMENT NOTE - NS ED NURSE REASSESS COMMENT FT1
Received pt to results waiting.  States he feels much better but still has wheezing but less than when he 1st arrived. awaiting reassessment by MD Continue to monitor pt for any further changes from baseline

## 2019-07-10 NOTE — ED ADULT TRIAGE NOTE - CHIEF COMPLAINT QUOTE
Presents to ED for asthma exacerbation.  Patient started experiencing wheezing and SOB yesterday, used Albuterol inhaler with some relief.  Respirations equal and unlabored.  Speaking in full sentences.  Last asthma exacerbation a month ago.  No other significant medical history.

## 2019-07-10 NOTE — ED PROVIDER NOTE - OBJECTIVE STATEMENT
33 year old male with a PMHx of asthma - no hx of intubations/ICU stays pw SOB, wheezing and chest tightness for about 8 hours. Pt has been using his inhaler with mild relief. States that he developed a dry cough since onset of symptoms. Denies n/v/f/c, CP, SOB, abdominal pain, dysuria, hematuria, melena, hematochezia, diarrhea, constipation.

## 2019-07-11 VITALS
OXYGEN SATURATION: 98 % | TEMPERATURE: 98 F | DIASTOLIC BLOOD PRESSURE: 73 MMHG | SYSTOLIC BLOOD PRESSURE: 123 MMHG | RESPIRATION RATE: 18 BRPM | HEART RATE: 74 BPM

## 2019-07-11 PROCEDURE — 99217: CPT

## 2019-07-11 RX ADMIN — SODIUM CHLORIDE 125 MILLILITER(S): 9 INJECTION INTRAMUSCULAR; INTRAVENOUS; SUBCUTANEOUS at 05:17

## 2019-07-11 RX ADMIN — Medication 40 MILLIGRAM(S): at 00:39

## 2019-07-11 RX ADMIN — Medication 3 MILLILITER(S): at 04:40

## 2019-07-11 RX ADMIN — Medication 40 MILLIGRAM(S): at 05:17

## 2019-07-11 RX ADMIN — SODIUM CHLORIDE 125 MILLILITER(S): 9 INJECTION INTRAMUSCULAR; INTRAVENOUS; SUBCUTANEOUS at 00:40

## 2019-07-11 NOTE — ED CDU PROVIDER DISPOSITION NOTE - NSFOLLOWUPINSTRUCTIONS_ED_ALL_ED_FT
Please follow up with your primary doctor in next 3 days. Take albuterol and steroids as prescribed. Return to ED if you develop worsening trouble breathing, fever, dizziness, leg swelling, chest pain or any other concerning symptom.

## 2019-07-11 NOTE — ED CDU PROVIDER SUBSEQUENT DAY NOTE - PROGRESS NOTE DETAILS
Pt objectively noted to be resting comfortably in the interim; no issues or c/o thus far; pt stable at present.  Pt. will be signed out to the day CDU PA (Pasquale) and attending (Dr. Ponce) at 0700 hrs. received sign out from CHANEL Lundberg. pt seen and examined by Dr. Ponce and RACHEL. pt feeling much better. lungs clear b/l. heart rrr. no le edema. no calf tenderness. pt amenable for dc home. as per Dr. Orozco cdu yesterday, medications sent to pharmacy

## 2019-07-11 NOTE — ED CDU PROVIDER SUBSEQUENT DAY NOTE - ATTENDING CONTRIBUTION TO CARE
Dr. Ponce: I performed a face to face bedside interview with patient regarding history of present illness, review of symptoms and past medical history. I completed an independent physical exam.  I have discussed patient's plan of care with PA.   I agree with note as stated above, having amended the EMR as needed to reflect my findings.   This includes HISTORY OF PRESENT ILLNESS, HIV, PAST MEDICAL/SURGICAL/FAMILY/SOCIAL HISTORY, ALLERGIES AND HOME MEDICATIONS, REVIEW OF SYSTEMS, PHYSICAL EXAM, and any PROGRESS NOTES during the time I functioned as the attending physician for this patient.    33M with pmh of asthma presented with chest tightness, wheezing and dyspnea. wheezing on exam upon arrival. No hypoxia. Negative CXR. Patient was treated with solumedrol, duonebs and magnesium and was placed in CDU for further treatment and evaluation. This morning feels improved, no wheezing on exam, normal O2 sat.

## 2019-07-11 NOTE — ED CDU PROVIDER DISPOSITION NOTE - CLINICAL COURSE
33M with pmh of asthma presented with chest tightness, wheezing and dyspnea. wheezing on exam upon arrival. No hypoxia. Negative CXR. Patient was treated with solumedrol, duonebs and magnesium and was placed in CDU for further treatment and evaluation. Upon discharge pt feels improved, no longer wheezing, with stable vitals. Discharged with steroids and nebs.

## 2019-07-11 NOTE — ED CDU PROVIDER SUBSEQUENT DAY NOTE - MUSCULOSKELETAL, MLM
Meds taken 0700.  Lab draw 1115.    Jennifre Lima, CMA     Spine appears normal, range of motion is not limited, gait stable.

## 2019-07-11 NOTE — ED CDU PROVIDER DISPOSITION NOTE - ATTENDING CONTRIBUTION TO CARE
Dr. Ponce: I performed a face to face bedside interview with patient regarding history of present illness, review of symptoms and past medical history. I completed an independent physical exam.  I have discussed patient's plan of care with PA.   I agree with note as stated above, having amended the EMR as needed to reflect my findings.   This includes HISTORY OF PRESENT ILLNESS, HIV, PAST MEDICAL/SURGICAL/FAMILY/SOCIAL HISTORY, ALLERGIES AND HOME MEDICATIONS, REVIEW OF SYSTEMS, PHYSICAL EXAM, and any PROGRESS NOTES during the time I functioned as the attending physician for this patient.

## 2019-07-11 NOTE — ED CDU PROVIDER SUBSEQUENT DAY NOTE - HISTORY
CDU Initial Note HPI: "32 y/o male with pmhx of asthma (no hx of intubations) presents to ED for asthma exacerbation x 1 day. Pt c/o chest tightness, sob and wheezing. Notes a change in weather usually causes his symptoms to get worse. Pt stopped taking his daily asthma med due to insurance issues. Pt improved with duonebs in ED. Peak flow 200 in ED, baseline is 300. No fever, chills sore throat rhinorrhea, cough, palpitations abd pain, n/v, weakness, numbness, dysuria. No recent travel, surgeries, hospitalizations, le edema. States last asthma exacerbation was 1-2 months ago"  Pt. was evaluated in the ED; dx with asthma exacerbation, dispo'd to CDU for continued care plan:  Nebs/steroids, supportive care, general observation care / monitoring.  In the interim, pt verbalizes feeling much improved; still having some wheezing, but states feeling better.  No c/o or issues in the interim thus far; pt objectively noted to be resting comfortably overnight.

## 2019-07-11 NOTE — ED CDU PROVIDER SUBSEQUENT DAY NOTE - CARDIAC, MLM
Normal rate, regular rhythm.  Heart sounds S1, S2.  No murmurs, rubs or gallops. no guarding/no rebound tenderness/soft/nontender

## 2019-07-11 NOTE — ED CDU PROVIDER SUBSEQUENT DAY NOTE - RESPIRATORY, MLM
Breath sounds equal bilaterally (slightly diminished in general), no active wheezing noted on this writers' evaluation; no rales/rhonchi/retractions.

## 2019-09-10 ENCOUNTER — TRANSCRIPTION ENCOUNTER (OUTPATIENT)
Age: 34
End: 2019-09-10

## 2019-10-07 ENCOUNTER — EMERGENCY (EMERGENCY)
Facility: HOSPITAL | Age: 34
LOS: 1 days | Discharge: HOME | End: 2019-10-07
Attending: INTERNAL MEDICINE | Admitting: INTERNAL MEDICINE
Payer: COMMERCIAL

## 2019-10-07 VITALS
OXYGEN SATURATION: 100 % | TEMPERATURE: 99 F | SYSTOLIC BLOOD PRESSURE: 115 MMHG | HEART RATE: 70 BPM | DIASTOLIC BLOOD PRESSURE: 91 MMHG | RESPIRATION RATE: 16 BRPM

## 2019-10-07 PROCEDURE — 99283 EMERGENCY DEPT VISIT LOW MDM: CPT

## 2019-10-07 NOTE — ED ADULT TRIAGE NOTE - CHIEF COMPLAINT QUOTE
Pt. with c/o LLQ pain x 2 days; Pt. stated he noticed blood in his stool with noticeable streaks of blood.  Pt. denies any weakness or dizziness.

## 2019-10-08 NOTE — ED PROVIDER NOTE - PATIENT PORTAL LINK FT
You can access the FollowMyHealth Patient Portal offered by [facility] by registering at the following website: [email or web address]. By joining [employer]’s FollowMyHealth portal, you will also be able to view your health information using other applications (apps) compatible with our system.

## 2019-10-08 NOTE — ED PROVIDER NOTE - OBJECTIVE STATEMENT
34 y/o M with PMHx of asthma presents to ED c/o exacerbated asthma, lower left cramping, blood in stool for 1x day, increase bloating, and flatus. Pt PO is well. Pt denies N/V/D, fever, chills, dysuria, testicular pain, and genital lesion.    PMH: Asthma  PSH: negative  FH/SH: non-contributory, except as noted in the HPI  Allergies: No known drug allergies  Immunizations: Up-to-date  Medications: No chronic home medications

## 2019-10-08 NOTE — ED PROVIDER NOTE - PHYSICAL EXAMINATION
Rectal Exam: no external lesions or abrasions, no palatable masses, brown stool involved, no blood    PA Adonay Woodard was chaperone for Exam.

## 2019-10-08 NOTE — ED PROVIDER NOTE - NSFOLLOWUPINSTRUCTIONS_ED_ALL_ED_FT
Please follow up with your primary medical doctor within two days.  Return to the emergency department if you feel that your condition is worsening or if you develop fever, chills, nausea or vomiting, if the pain recurs or moves to the right lower side of your abdomen, if you develop significant rectal bleeding or if you begin to feel your heart racing, fatigued or short of breath.

## 2019-10-08 NOTE — ED PROVIDER NOTE - CLINICAL SUMMARY MEDICAL DECISION MAKING FREE TEXT BOX
32 y/o M presents to ED c/o exacerbated asthma, lower left cramping, blood in stool for 1x day. Symptoms consistent with anemia. 1 bloody stool today, likely constipated abrasions due to firm stools no evidence of upper of lower GI  bleeding, Pt will need outpatient follow up 32 y/o M hx asthma with presents to ED, left lower abdominal cramping, blood in stool for 1x day. No symptoms of anemia. 1 bloody stool today, likely constipated abrasions due to firm stools no evidence of upper of lower GI  bleeding.  Benign abdominal exam.  No evidence of infectious etiology.  Stable for discharge to outpatient follow-up with instructions ot increase PO fluid and fruit intake.  Will follow-up with his PMD and obtain GI referral

## 2019-12-01 ENCOUNTER — EMERGENCY (EMERGENCY)
Facility: HOSPITAL | Age: 34
LOS: 1 days | Discharge: HOME | End: 2019-12-01
Attending: EMERGENCY MEDICINE | Admitting: EMERGENCY MEDICINE
Payer: COMMERCIAL

## 2019-12-01 VITALS
OXYGEN SATURATION: 95 % | TEMPERATURE: 98 F | RESPIRATION RATE: 16 BRPM | HEART RATE: 100 BPM | SYSTOLIC BLOOD PRESSURE: 128 MMHG | DIASTOLIC BLOOD PRESSURE: 81 MMHG

## 2019-12-01 PROCEDURE — 99284 EMERGENCY DEPT VISIT MOD MDM: CPT

## 2019-12-01 RX ORDER — IPRATROPIUM/ALBUTEROL SULFATE 18-103MCG
3 AEROSOL WITH ADAPTER (GRAM) INHALATION ONCE
Refills: 0 | Status: COMPLETED | OUTPATIENT
Start: 2019-12-01 | End: 2019-12-01

## 2019-12-01 RX ORDER — ALBUTEROL 90 UG/1
1 AEROSOL, METERED ORAL ONCE
Refills: 0 | Status: COMPLETED | OUTPATIENT
Start: 2019-12-01 | End: 2019-12-01

## 2019-12-01 RX ADMIN — Medication 50 MILLIGRAM(S): at 04:51

## 2019-12-01 RX ADMIN — Medication 3 MILLILITER(S): at 04:51

## 2019-12-01 RX ADMIN — Medication 3 MILLILITER(S): at 05:23

## 2019-12-01 RX ADMIN — ALBUTEROL 1 PUFF(S): 90 AEROSOL, METERED ORAL at 06:43

## 2019-12-01 NOTE — ED PROVIDER NOTE - NSFOLLOWUPINSTRUCTIONS_ED_ALL_ED_FT
You were seen in the emergency department for an asthma exacerbation.  You received nebulizers and were started on steroids.  Continue to take prednisone 50mg once a day for the next 4 days.  Use your albuterol 2 puffs every 4-6 hours as needed.    Drink plenty of fluids.  You can take ibuprofen 600mg every 6 hours or Tylenol 650mg every 4 hours as needed for pain or fever.  Follow-up with your PMD in 24-48 hours.  Return to the emergency department for any new or worsening symptoms.

## 2019-12-01 NOTE — ED PROVIDER NOTE - ATTENDING CONTRIBUTION TO CARE
32 y/o M with h/o mild, intermittent asthma here with SOB x 1 day.  Pt reports sxs started this evening while at work.  He states he passed by "steam" from a hot grill being washed with water, which triggered his symptoms.  No fever, cp, back pain, leg pain or swelling, nausea or vomiting.  No chemical exposures.  No URI sxs.  Well appearing, sitting comfortably in stretcher, awake and alert, nontoxic.  VSS, no hypoxia.  Pt is speaking in full sentences, no increased work of breathing.  Lungs cta bl with end expiratory wheezing in all lung fields.  Cards nl S1/S2, RRR, no MRG.  Abd soft ntnd.  No pedal edema or calf tenderness.  Likely asthma exacerbation - will give nebs, steroids, reassess.  No e/o infection or trauma.

## 2019-12-01 NOTE — ED ADULT NURSE NOTE - NSIMPLEMENTINTERV_GEN_ALL_ED
Implemented All Universal Safety Interventions:  [place] to call system. Call bell, personal items and telephone within reach. Instruct patient to call for assistance. Room bathroom lighting operational. Non-slip footwear when patient is off stretcher. Physically safe environment: no spills, clutter or unnecessary equipment. Stretcher in lowest position, wheels locked, appropriate side rails in place.

## 2019-12-01 NOTE — ED ADULT TRIAGE NOTE - CHIEF COMPLAINT QUOTE
Pt arrives to ED c/o shortness of breath after inhaling debris from a kitchen grill being cleaned.  Pt tried using inhaler but it did not help.  History of asthma.  Pt wheezing in triage.

## 2019-12-01 NOTE — ED PROVIDER NOTE - PATIENT PORTAL LINK FT
You can access the FollowMyHealth Patient Portal offered by [facility] by registering at the following website: [email or web address]. By joining [employer]’s FollowMyHealth portal, you will also be able to view your health information using other applications (apps) compatible with our system.

## 2019-12-01 NOTE — ED PROVIDER NOTE - PROGRESS NOTE DETAILS
Jose Carlos LEONARDO: Upon reassessment, pt reports feeling better.  No tachypnea or increased work of breathing.  Lungs are clear to ausculation bilaterally, good air entry, no wheezing appreciated.  Pt is stable for dc and outpatient follow-up - understands to cont albuterol, steroid course. Watts PGY3: peak flow 300, no wheezes, given albuterol inhaler to go

## 2019-12-01 NOTE — ED ADULT NURSE NOTE - OBJECTIVE STATEMENT
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 no relief from inhaler. denies chest pain. nad noted. md assessed. will monitor

## 2019-12-01 NOTE — ED PROVIDER NOTE - OBJECTIVE STATEMENT
33M hx asthma (never intubated), presenting w/ increased sob/wheezing since 2AM. 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 States that his home inhaler has not been helping with his symptoms and he had no steroids at home. Denies fever. No chest pain. No nausea/vomiting.

## 2020-02-07 ENCOUNTER — EMERGENCY (EMERGENCY)
Facility: HOSPITAL | Age: 35
LOS: 1 days | Discharge: HOME | End: 2020-02-07
Payer: SELF-PAY

## 2020-02-07 VITALS
SYSTOLIC BLOOD PRESSURE: 148 MMHG | HEART RATE: 103 BPM | RESPIRATION RATE: 16 BRPM | TEMPERATURE: 98 F | DIASTOLIC BLOOD PRESSURE: 77 MMHG | OXYGEN SATURATION: 98 %

## 2020-02-07 LAB
FLU A RESULT: NOT DETECTED — SIGNIFICANT CHANGE UP
FLU A RESULT: NOT DETECTED — SIGNIFICANT CHANGE UP
FLUAV AG NPH QL: NOT DETECTED — SIGNIFICANT CHANGE UP
FLUBV AG NPH QL: NOT DETECTED — SIGNIFICANT CHANGE UP
RSV RESULT: SIGNIFICANT CHANGE UP
RSV RNA RESP QL NAA+PROBE: SIGNIFICANT CHANGE UP

## 2020-02-07 PROCEDURE — 71046 X-RAY EXAM CHEST 2 VIEWS: CPT | Mod: 26

## 2020-02-07 PROCEDURE — 99284 EMERGENCY DEPT VISIT MOD MDM: CPT

## 2020-02-07 RX ORDER — IPRATROPIUM/ALBUTEROL SULFATE 18-103MCG
3 AEROSOL WITH ADAPTER (GRAM) INHALATION ONCE
Refills: 0 | Status: COMPLETED | OUTPATIENT
Start: 2020-02-07 | End: 2020-02-07

## 2020-02-07 RX ADMIN — Medication 3 MILLILITER(S): at 14:26

## 2020-02-07 RX ADMIN — Medication 3 MILLILITER(S): at 16:11

## 2020-02-07 RX ADMIN — Medication 40 MILLIGRAM(S): at 14:26

## 2020-02-07 RX ADMIN — Medication 3 MILLILITER(S): at 15:28

## 2020-02-07 RX ADMIN — Medication 1200 MILLIGRAM(S): at 14:54

## 2020-02-07 NOTE — ED PROVIDER NOTE - CLINICAL SUMMARY MEDICAL DECISION MAKING FREE TEXT BOX
35 y/o male w/ asthma exacerbation likely 2/2 viral infection- will give nebs, steroids, mucinex, CXR and reassess

## 2020-02-07 NOTE — ED ADULT TRIAGE NOTE - CHIEF COMPLAINT QUOTE
c/o coughing with green sputum, chills x 3 days. Speaking full sentences. Breathing unlabored. Hx asthma

## 2020-02-07 NOTE — ED ADULT NURSE NOTE - OBJECTIVE STATEMENT
received pt in bed A and Ox 3  in NAD resting comfortably, communicates with full and complete sentences. expiratory wheezing noted, audible, pt reports SOB onset last night, unrelieved with home treatment. pt denies fever, chills, dry non productive cough noted. orders noted and completed

## 2020-02-07 NOTE — ED PROVIDER NOTE - PATIENT PORTAL LINK FT
You can access the FollowMyHealth Patient Portal offered by [facility] by registering at the following website: [email or web address]. By joining [employer]’s FollowMyHealth portal, you will also be able to view your health information using other applications (apps) compatible with our system.

## 2020-02-07 NOTE — ED PROVIDER NOTE - PROGRESS NOTE DETAILS
CHANEL Overton- Pt feeling better after ER stay, wheezing improved, cxr neg for acute infiltrate, rapid flu neg. Pt stable for dc.

## 2020-02-07 NOTE — ED PROVIDER NOTE - OBJECTIVE STATEMENT
33 y/o male pmh asthma, never intubated, c/o cough and sob x3 days. Pt admits to productive cough with greenish sputum. Pt admits to mild sob and wheezing as well. Pt has been using albuterol every 4 hours with mild relief. Pt denies chest pain, abd pain, n/v/d, numbness, tingling, weakness, dizziness, syncope, fever or chills. Denies recent travel.

## 2021-09-08 ENCOUNTER — TRANSCRIPTION ENCOUNTER (OUTPATIENT)
Age: 36
End: 2021-09-08

## 2022-03-29 NOTE — ED PROVIDER NOTE - CLINICAL SUMMARY MEDICAL DECISION MAKING FREE TEXT BOX
Interval History: Admitted to hospital medicine for NSTEMI and started on ACS protocol with heparin drip. Diuresed for ADHF    ROS  Objective:     Vital Signs (Most Recent):  Temp: 97.2 °F (36.2 °C) (03/29/22 0759)  Pulse: 84 (03/29/22 0806)  Resp: 18 (03/29/22 0806)  BP: (!) 175/82 (03/29/22 0759)  SpO2: (!) 94 % (03/29/22 0806)   Vital Signs (24h Range):  Temp:  [97.2 °F (36.2 °C)-98.6 °F (37 °C)] 97.2 °F (36.2 °C)  Pulse:  [65-90] 84  Resp:  [12-29] 18  SpO2:  [91 %-100 %] 94 %  BP: (120-175)/(67-82) 175/82     Weight: 99.3 kg (218 lb 14.7 oz)  Body mass index is 32.33 kg/m².     SpO2: (!) 94 %  O2 Device (Oxygen Therapy): room air      Intake/Output Summary (Last 24 hours) at 3/29/2022 1100  Last data filed at 3/29/2022 0943  Gross per 24 hour   Intake 109.44 ml   Output --   Net 109.44 ml       Lines/Drains/Airways       Peripheral Intravenous Line  Duration                  Peripheral IV - Single Lumen 03/28/22 2247 20 G Right Antecubital <1 day                    Physical Exam  Vitals and nursing note reviewed.   Constitutional:       General: He is not in acute distress.     Appearance: He is not ill-appearing, toxic-appearing or diaphoretic.   HENT:      Head: Normocephalic.   Cardiovascular:      Rate and Rhythm: Normal rate and regular rhythm.      Heart sounds: No murmur heard.     Comments: Slightly elevated JVP around 3cm above clavicle  Pulmonary:      Effort: Pulmonary effort is normal.      Breath sounds: No wheezing.   Abdominal:      Palpations: Abdomen is soft.      Tenderness: There is no guarding.   Skin:     General: Skin is warm and dry.   Neurological:      Mental Status: He is alert. Mental status is at baseline.      Cranial Nerves: No dysarthria.   Psychiatric:         Mood and Affect: Mood normal.         Behavior: Behavior normal.       Significant Labs: All pertinent lab results from the last 24 hours have been reviewed.    Significant Imaging: EKG: reviewed   33 year old male with a PMHx of asthma - no hx of intubations/ICU stays pw SOB, wheezing and chest tightness for about 8 hours.  Plan: nebs, steroids, mag, labs, cxr for asthma exacerbation

## 2022-08-17 NOTE — ED ADULT TRIAGE NOTE - NS ED NURSE BANDS TYPE
Medication(s) Requested: Potassium  Last office visit: 08-  Last refill: 05-  Is the patient due for refill of this medication(s): Yes  PDMP review: Criteria met. Forwarded to Physician/EVELINE for signature.        Name band;

## 2022-09-14 NOTE — ED PROVIDER NOTE - CONSTITUTIONAL NEGATIVE STATEMENT, MLM
Medication name: empagliflozin (JARDIANCE) 10 MG tablet  Last Refill Details: Date 9/6/2022, # of tablets: 90, # of refills approved:0  Ordering provider name: Ana Garcia MD  Last office visit: 9/6/2022 with provider Ana Garcia MD  Pharmacy request change    Medication name:aspirin 81 MG EC tablet  Last Refill Details: Date 2/18/2021, # of tablets: 30, # of refills approved:11  Ordering provider name: Jo Solano MD  Last office visit: 9/6/2022 with provider Ana Garcia MD  Unable to refill medication per established guidelines, request forwarded to provider for review.  Caller advised to contact office for follow-up.               no fever and no chills.

## 2022-10-20 ENCOUNTER — NON-APPOINTMENT (OUTPATIENT)
Age: 37
End: 2022-10-20

## 2023-05-04 NOTE — ED ADULT NURSE NOTE - CHIEF COMPLAINT QUOTE
Pt c/o non-radiating LLQ abd tenderness to palpation/uneasiness for 48hrs, denies n/v/dysuria/fevers/chills.  Pt reports increased pressure w/ BM, noticed scant amt blood after BM when wiping.  PMHx Asthma, denies sob/cp or other symptoms.  Pt appears comfortable in triage, no guarding/grimacing noticed. 0 = swallows foods/liquids without difficulty

## 2023-06-03 ENCOUNTER — NON-APPOINTMENT (OUTPATIENT)
Age: 38
End: 2023-06-03

## 2025-08-13 ENCOUNTER — NON-APPOINTMENT (OUTPATIENT)
Age: 40
End: 2025-08-13